# Patient Record
Sex: FEMALE | Race: WHITE | Employment: UNEMPLOYED | URBAN - METROPOLITAN AREA
[De-identification: names, ages, dates, MRNs, and addresses within clinical notes are randomized per-mention and may not be internally consistent; named-entity substitution may affect disease eponyms.]

---

## 2019-09-24 PROBLEM — Z82.79 FAMILY HISTORY OF CONGENITAL ANOMALY OF CARDIOVASCULAR SYSTEM: Status: ACTIVE | Noted: 2019-09-24

## 2019-09-24 PROCEDURE — 86901 BLOOD TYPING SEROLOGIC RH(D): CPT | Performed by: OBSTETRICS & GYNECOLOGY

## 2019-09-24 PROCEDURE — 86900 BLOOD TYPING SEROLOGIC ABO: CPT | Performed by: OBSTETRICS & GYNECOLOGY

## 2019-09-24 PROCEDURE — 87086 URINE CULTURE/COLONY COUNT: CPT | Performed by: OBSTETRICS & GYNECOLOGY

## 2019-09-24 PROCEDURE — 86850 RBC ANTIBODY SCREEN: CPT | Performed by: OBSTETRICS & GYNECOLOGY

## 2019-10-16 PROBLEM — Z34.80 ENCOUNTER FOR SUPERVISION OF OTHER NORMAL PREGNANCY, UNSPECIFIED TRIMESTER: Status: ACTIVE | Noted: 2019-10-16

## 2019-10-16 PROBLEM — Z34.80 ENCOUNTER FOR SUPERVISION OF OTHER NORMAL PREGNANCY, UNSPECIFIED TRIMESTER (HCC): Status: ACTIVE | Noted: 2019-10-16

## 2019-12-12 ENCOUNTER — TELEPHONE (OUTPATIENT)
Dept: PERINATAL CARE | Facility: HOSPITAL | Age: 29
End: 2019-12-12

## 2019-12-12 ENCOUNTER — OFFICE VISIT (OUTPATIENT)
Dept: PERINATAL CARE | Facility: HOSPITAL | Age: 29
End: 2019-12-12
Attending: OBSTETRICS & GYNECOLOGY
Payer: COMMERCIAL

## 2019-12-12 VITALS
SYSTOLIC BLOOD PRESSURE: 131 MMHG | WEIGHT: 150 LBS | DIASTOLIC BLOOD PRESSURE: 75 MMHG | HEART RATE: 97 BPM | HEIGHT: 66 IN | BODY MASS INDEX: 24.11 KG/M2

## 2019-12-12 DIAGNOSIS — Z82.79 FAMILY HISTORY OF CONGENITAL ANOMALY OF CARDIOVASCULAR SYSTEM: ICD-10-CM

## 2019-12-12 PROCEDURE — 76811 OB US DETAILED SNGL FETUS: CPT | Performed by: OBSTETRICS & GYNECOLOGY

## 2019-12-12 PROCEDURE — 99203 OFFICE O/P NEW LOW 30 MIN: CPT | Performed by: OBSTETRICS & GYNECOLOGY

## 2019-12-12 NOTE — PROGRESS NOTES
Outpatient Maternal-Fetal Medicine Consultation    Dear Dr. Harrison Porter    Thank you for requesting ultrasound evaluation and maternal fetal medicine consultation on your patient Cristela Maria.   As you are aware she is a 26year old female  with a sing ____________________________________________________________________________  Dating:  LMP: 07/24/2019 EDC: 04/29/2020 GA by LMP: 20w1d  Current Scan on: 12/12/2019 EDC: 04/26/2020 GA by current scan: 20w4d  The calculation of the gestational age by curr mm.  ____________________________________________________________________________    See PACS/Imaging Tab For Complete Ultrasound Report  I interpreted the results and reviewed them with the patient.     DISCUSSION  During her visit we discussed and reviewe

## 2020-06-09 PROBLEM — Z34.80 ENCOUNTER FOR SUPERVISION OF OTHER NORMAL PREGNANCY, UNSPECIFIED TRIMESTER (HCC): Status: RESOLVED | Noted: 2019-10-16 | Resolved: 2020-06-09

## 2020-06-09 PROBLEM — Z34.80 ENCOUNTER FOR SUPERVISION OF OTHER NORMAL PREGNANCY, UNSPECIFIED TRIMESTER: Status: RESOLVED | Noted: 2019-10-16 | Resolved: 2020-06-09

## 2021-04-07 ENCOUNTER — TELEPHONE (OUTPATIENT)
Dept: ORTHOPEDICS CLINIC | Facility: CLINIC | Age: 31
End: 2021-04-07

## 2021-04-07 ENCOUNTER — HOSPITAL ENCOUNTER (OUTPATIENT)
Age: 31
Discharge: HOME | End: 2021-04-07
Payer: COMMERCIAL

## 2021-04-07 VITALS
RESPIRATION RATE: 16 BRPM | SYSTOLIC BLOOD PRESSURE: 126 MMHG | TEMPERATURE: 98 F | HEART RATE: 65 BPM | DIASTOLIC BLOOD PRESSURE: 71 MMHG | OXYGEN SATURATION: 98 %

## 2021-04-07 DIAGNOSIS — M25.512 LEFT SHOULDER PAIN, UNSPECIFIED CHRONICITY: Primary | ICD-10-CM

## 2021-04-07 DIAGNOSIS — M75.42 IMPINGEMENT SYNDROME OF LEFT SHOULDER: Primary | ICD-10-CM

## 2021-04-07 PROCEDURE — 99203 OFFICE O/P NEW LOW 30 MIN: CPT | Performed by: PHYSICIAN ASSISTANT

## 2021-04-07 RX ORDER — PREDNISONE 20 MG/1
40 TABLET ORAL DAILY
Qty: 10 TABLET | Refills: 0 | Status: SHIPPED | OUTPATIENT
Start: 2021-04-07 | End: 2021-04-12

## 2021-04-07 NOTE — TELEPHONE ENCOUNTER
Patient coming in for LT shoulder trama. Immediate care belies that it might be a shoulder Tear. Patient did not have any imaging done. If imaging needed please place Rx and notify patient before apt on 4/9. Thank you.

## 2021-04-07 NOTE — TELEPHONE ENCOUNTER
Ordered xrays and messaged patient the information she needed to get them done before her appointment on Friday in [place].

## 2021-04-07 NOTE — ED PROVIDER NOTES
Patient Seen in: [facility]      History   Patient presents with:  Shoulder Pain    Stated Complaint: left shoulder pain    HPI/Subjective:   HPI    Pleasant 50-year-old female. Right-hand-dominant. Medical history of OCD and appendectomy. Extremities: The patient's anterior rotator cuff region is moderately tender. Limited range of motion actively. Passively, allows Full range of motion. Highly suggestive of rotator cuff injury versus acute impingement syndrome.   She has no AC joint te

## 2021-04-07 NOTE — ED INITIAL ASSESSMENT (HPI)
States past 2 weeks moving and picking up a lot of boxes and her kids. Left shoulder pain mild. 2 days ago after lifting a  Heavier box had sharp pain in her left shoulder, unable to raise arm, limited movement, states pain to any touch.  Taking advil with

## 2021-04-09 ENCOUNTER — OFFICE VISIT (OUTPATIENT)
Dept: ORTHOPEDICS CLINIC | Facility: CLINIC | Age: 31
End: 2021-04-09
Payer: COMMERCIAL

## 2021-04-09 DIAGNOSIS — Z02.9 ADMINISTRATIVE ENCOUNTER: Primary | ICD-10-CM

## 2021-05-01 ENCOUNTER — IMMUNIZATION (OUTPATIENT)
Dept: LAB | Age: 31
End: 2021-05-01
Attending: HOSPITALIST
Payer: COMMERCIAL

## 2021-05-01 DIAGNOSIS — Z23 NEED FOR VACCINATION: Primary | ICD-10-CM

## 2021-05-01 PROCEDURE — 0001A SARSCOV2 VAC 30MCG/0.3ML IM: CPT

## 2021-06-12 ENCOUNTER — IMMUNIZATION (OUTPATIENT)
Dept: LAB | Facility: HOSPITAL | Age: 31
End: 2021-06-12
Attending: HOSPITALIST
Payer: COMMERCIAL

## 2021-06-12 DIAGNOSIS — Z23 NEED FOR VACCINATION: Primary | ICD-10-CM

## 2021-06-12 PROCEDURE — 0002A SARSCOV2 VAC 30MCG/0.3ML IM: CPT

## 2021-08-26 ENCOUNTER — TELEPHONE (OUTPATIENT)
Dept: FAMILY MEDICINE CLINIC | Facility: CLINIC | Age: 31
End: 2021-08-26

## 2021-08-26 NOTE — TELEPHONE ENCOUNTER
Pt called  sees her kids and mom would like to establish care with  please advise if its okay      Thank you.

## 2021-08-30 ENCOUNTER — OFFICE VISIT (OUTPATIENT)
Dept: FAMILY MEDICINE CLINIC | Facility: CLINIC | Age: 31
End: 2021-08-30
Payer: COMMERCIAL

## 2021-08-30 VITALS
DIASTOLIC BLOOD PRESSURE: 80 MMHG | SYSTOLIC BLOOD PRESSURE: 110 MMHG | RESPIRATION RATE: 16 BRPM | HEART RATE: 75 BPM | TEMPERATURE: 99 F | OXYGEN SATURATION: 100 % | HEIGHT: 66 IN | WEIGHT: 127 LBS | BODY MASS INDEX: 20.41 KG/M2

## 2021-08-30 DIAGNOSIS — Z86.2 HISTORY OF ANEMIA: ICD-10-CM

## 2021-08-30 DIAGNOSIS — R10.11 COLICKY RUQ ABDOMINAL PAIN: ICD-10-CM

## 2021-08-30 DIAGNOSIS — R42 ORTHOSTATIC LIGHTHEADEDNESS: ICD-10-CM

## 2021-08-30 DIAGNOSIS — H61.23 BILATERAL IMPACTED CERUMEN: ICD-10-CM

## 2021-08-30 DIAGNOSIS — Z00.00 HEALTHY ADULT ON ROUTINE PHYSICAL EXAMINATION: Primary | ICD-10-CM

## 2021-08-30 LAB
ALBUMIN SERPL-MCNC: 4.3 G/DL (ref 3.4–5)
ALBUMIN/GLOB SERPL: 1.3 {RATIO} (ref 1–2)
ALP LIVER SERPL-CCNC: 49 U/L
ALT SERPL-CCNC: 19 U/L
ANION GAP SERPL CALC-SCNC: 3 MMOL/L (ref 0–18)
AST SERPL-CCNC: 11 U/L (ref 15–37)
BASOPHILS # BLD AUTO: 0.05 X10(3) UL (ref 0–0.2)
BASOPHILS NFR BLD AUTO: 0.6 %
BILIRUB SERPL-MCNC: 0.5 MG/DL (ref 0.1–2)
BUN BLD-MCNC: 15 MG/DL (ref 7–18)
CALCIUM BLD-MCNC: 8.9 MG/DL (ref 8.5–10.1)
CHLORIDE SERPL-SCNC: 110 MMOL/L (ref 98–112)
CHOLEST SMN-MCNC: 167 MG/DL (ref ?–200)
CO2 SERPL-SCNC: 26 MMOL/L (ref 21–32)
CREAT BLD-MCNC: 0.74 MG/DL
DEPRECATED HBV CORE AB SER IA-ACNC: 16.3 NG/ML
EOSINOPHIL # BLD AUTO: 0.35 X10(3) UL (ref 0–0.7)
EOSINOPHIL NFR BLD AUTO: 4.5 %
ERYTHROCYTE [DISTWIDTH] IN BLOOD BY AUTOMATED COUNT: 12.9 %
GLOBULIN PLAS-MCNC: 3.4 G/DL (ref 2.8–4.4)
GLUCOSE BLD-MCNC: 86 MG/DL (ref 70–99)
HCT VFR BLD AUTO: 42.9 %
HDLC SERPL-MCNC: 63 MG/DL (ref 40–59)
HGB BLD-MCNC: 14.1 G/DL
IMM GRANULOCYTES # BLD AUTO: 0.02 X10(3) UL (ref 0–1)
IMM GRANULOCYTES NFR BLD: 0.3 %
IRON SATURATION: 14 %
IRON SERPL-MCNC: 53 UG/DL
LDLC SERPL CALC-MCNC: 92 MG/DL (ref ?–100)
LIPASE SERPL-CCNC: 152 U/L (ref 73–393)
LYMPHOCYTES # BLD AUTO: 1.66 X10(3) UL (ref 1–4)
LYMPHOCYTES NFR BLD AUTO: 21.5 %
M PROTEIN MFR SERPL ELPH: 7.7 G/DL (ref 6.4–8.2)
MCH RBC QN AUTO: 29.7 PG (ref 26–34)
MCHC RBC AUTO-ENTMCNC: 32.9 G/DL (ref 31–37)
MCV RBC AUTO: 90.5 FL
MONOCYTES # BLD AUTO: 0.55 X10(3) UL (ref 0.1–1)
MONOCYTES NFR BLD AUTO: 7.1 %
NEUTROPHILS # BLD AUTO: 5.09 X10 (3) UL (ref 1.5–7.7)
NEUTROPHILS # BLD AUTO: 5.09 X10(3) UL (ref 1.5–7.7)
NEUTROPHILS NFR BLD AUTO: 66 %
NONHDLC SERPL-MCNC: 104 MG/DL (ref ?–130)
OSMOLALITY SERPL CALC.SUM OF ELEC: 288 MOSM/KG (ref 275–295)
PATIENT FASTING Y/N/NP: NO
PATIENT FASTING Y/N/NP: NO
PLATELET # BLD AUTO: 289 10(3)UL (ref 150–450)
POTASSIUM SERPL-SCNC: 4.1 MMOL/L (ref 3.5–5.1)
RBC # BLD AUTO: 4.74 X10(6)UL
SODIUM SERPL-SCNC: 139 MMOL/L (ref 136–145)
TOTAL IRON BINDING CAPACITY: 380 UG/DL (ref 240–450)
TRANSFERRIN SERPL-MCNC: 255 MG/DL (ref 200–360)
TRIGL SERPL-MCNC: 63 MG/DL (ref 30–149)
TSI SER-ACNC: 1.97 MIU/ML (ref 0.36–3.74)
VLDLC SERPL CALC-MCNC: 10 MG/DL (ref 0–30)
WBC # BLD AUTO: 7.7 X10(3) UL (ref 4–11)

## 2021-08-30 PROCEDURE — 3074F SYST BP LT 130 MM HG: CPT | Performed by: FAMILY MEDICINE

## 2021-08-30 PROCEDURE — 3008F BODY MASS INDEX DOCD: CPT | Performed by: FAMILY MEDICINE

## 2021-08-30 PROCEDURE — 83540 ASSAY OF IRON: CPT | Performed by: FAMILY MEDICINE

## 2021-08-30 PROCEDURE — 83550 IRON BINDING TEST: CPT | Performed by: FAMILY MEDICINE

## 2021-08-30 PROCEDURE — 80061 LIPID PANEL: CPT | Performed by: FAMILY MEDICINE

## 2021-08-30 PROCEDURE — 3079F DIAST BP 80-89 MM HG: CPT | Performed by: FAMILY MEDICINE

## 2021-08-30 PROCEDURE — 80050 GENERAL HEALTH PANEL: CPT | Performed by: FAMILY MEDICINE

## 2021-08-30 PROCEDURE — 99395 PREV VISIT EST AGE 18-39: CPT | Performed by: FAMILY MEDICINE

## 2021-08-30 PROCEDURE — 82728 ASSAY OF FERRITIN: CPT | Performed by: FAMILY MEDICINE

## 2021-08-30 PROCEDURE — 83690 ASSAY OF LIPASE: CPT | Performed by: FAMILY MEDICINE

## 2021-08-30 NOTE — PROGRESS NOTES
HPI:   Francisca Gandhi is a 31year old female who presents for a complete physical exam. Symptoms: denies discharge, itching, burning or dysuria, periods are irregular on nexplanon.       Patient complains of chest pain a few days ago, RUQ to chest area twice daily 3x weekly.        Immunization History  Administered            Date(s) Administered    Covid-19 Vaccine Pfizer 30 mcg/0.3 ml                          05/01/2021 06/12/2021      TDAP                  01/01/2010 03/12/2020    Wt Readings from L feels well otherwise  SKIN: denies any unusual skin lesions  EYES:denies blurred vision or double vision  HEENT: denies nasal congestion, sinus pain or ST  LUNGS: denies shortness of breath with exertion  CARDIOVASCULAR: denies chest pain on exertion  GI: sooner if needed. Healthy adult on routine physical examination  (primary encounter diagnosis)  Orthostatic lightheadedness  History of anemia  Colicky ruq abdominal pain  Bilateral impacted cerumen  - labs as ordered.   Had full cardiac w/u in the past

## 2021-08-31 ENCOUNTER — TELEPHONE (OUTPATIENT)
Dept: FAMILY MEDICINE CLINIC | Facility: CLINIC | Age: 31
End: 2021-08-31

## 2021-08-31 NOTE — TELEPHONE ENCOUNTER
Spoke with patient who states she read  lab result notes.  She wanted to make sure the ABD US will view the kidneys, not just the liver and gallbladder    Routed to  to advise

## 2021-08-31 NOTE — TELEPHONE ENCOUNTER
Patient called requesting an order to be place for CT SCAN OF KIDNEYS.     Please advise # [phone]

## 2022-03-01 ENCOUNTER — TELEPHONE (OUTPATIENT)
Dept: FAMILY MEDICINE CLINIC | Facility: CLINIC | Age: 32
End: 2022-03-01

## 2022-03-01 NOTE — TELEPHONE ENCOUNTER
Patent states she seems no better/worse since last seen.   Headaches, dizziness, heart palpitations      Please adv    Thank you

## 2022-03-01 NOTE — TELEPHONE ENCOUNTER
Pt states she has had a cough for a year that has been concerning to her. She does not feel sick, She has clear phlegm some difficulty swallowing. Takes OTC  Prilosec. Reduced spicy foods. She mentioned having angina in the past and having a family history of heart issues. Pt is ok with starting with orders. Future Appointments   Date Time Provider Cristobal Hernandez   3/2/2022  2:00 PM ALEXANDER KEMP NURSE GABE Sapp her phone number to schedule holter.

## 2022-03-01 NOTE — TELEPHONE ENCOUNTER
Lets repeat the iron and order a holter monitor and see what that shows. Orders placed, pls give her scheduling info.

## 2022-03-02 ENCOUNTER — NURSE ONLY (OUTPATIENT)
Dept: FAMILY MEDICINE CLINIC | Facility: CLINIC | Age: 32
End: 2022-03-02
Payer: COMMERCIAL

## 2022-03-02 DIAGNOSIS — R42 DIZZINESS: ICD-10-CM

## 2022-03-02 DIAGNOSIS — R00.2 PALPITATIONS: ICD-10-CM

## 2022-03-02 LAB
BASOPHILS # BLD AUTO: 0.06 X10(3) UL (ref 0–0.2)
BASOPHILS NFR BLD AUTO: 0.9 %
DEPRECATED HBV CORE AB SER IA-ACNC: 15.7 NG/ML
EOSINOPHIL # BLD AUTO: 0.33 X10(3) UL (ref 0–0.7)
EOSINOPHIL NFR BLD AUTO: 4.7 %
ERYTHROCYTE [DISTWIDTH] IN BLOOD BY AUTOMATED COUNT: 12.9 %
HCT VFR BLD AUTO: 41.7 %
HGB BLD-MCNC: 13.9 G/DL
IMM GRANULOCYTES # BLD AUTO: 0.01 X10(3) UL (ref 0–1)
IMM GRANULOCYTES NFR BLD: 0.1 %
IRON SATN MFR SERPL: 19 %
IRON SERPL-MCNC: 74 UG/DL
LYMPHOCYTES # BLD AUTO: 1.52 X10(3) UL (ref 1–4)
LYMPHOCYTES NFR BLD AUTO: 21.7 %
MCH RBC QN AUTO: 29.6 PG (ref 26–34)
MCHC RBC AUTO-ENTMCNC: 33.3 G/DL (ref 31–37)
MCV RBC AUTO: 88.9 FL
MONOCYTES # BLD AUTO: 0.39 X10(3) UL (ref 0.1–1)
MONOCYTES NFR BLD AUTO: 5.6 %
NEUTROPHILS # BLD AUTO: 4.7 X10 (3) UL (ref 1.5–7.7)
NEUTROPHILS # BLD AUTO: 4.7 X10(3) UL (ref 1.5–7.7)
NEUTROPHILS NFR BLD AUTO: 67 %
PLATELET # BLD AUTO: 322 10(3)UL (ref 150–450)
RBC # BLD AUTO: 4.69 X10(6)UL
TIBC SERPL-MCNC: 387 UG/DL (ref 240–450)
TRANSFERRIN SERPL-MCNC: 260 MG/DL (ref 200–360)
WBC # BLD AUTO: 7 X10(3) UL (ref 4–11)

## 2022-03-02 PROCEDURE — 85025 COMPLETE CBC W/AUTO DIFF WBC: CPT | Performed by: FAMILY MEDICINE

## 2022-03-02 PROCEDURE — 83550 IRON BINDING TEST: CPT | Performed by: FAMILY MEDICINE

## 2022-03-02 PROCEDURE — 82728 ASSAY OF FERRITIN: CPT | Performed by: FAMILY MEDICINE

## 2022-03-02 PROCEDURE — 83540 ASSAY OF IRON: CPT | Performed by: FAMILY MEDICINE

## 2022-03-02 NOTE — PROGRESS NOTES
Pt here for nurse visit labs ordered by LILLIANA. 1 gold tube and 1 purple tube drawn from right arm using 1\" needle. Pt left in good condition.

## 2022-03-31 ENCOUNTER — TELEPHONE (OUTPATIENT)
Dept: FAMILY MEDICINE CLINIC | Facility: CLINIC | Age: 32
End: 2022-03-31

## 2022-10-18 ENCOUNTER — TELEPHONE (OUTPATIENT)
Dept: FAMILY MEDICINE CLINIC | Facility: CLINIC | Age: 32
End: 2022-10-18

## 2022-10-18 DIAGNOSIS — R42 ORTHOSTATIC LIGHTHEADEDNESS: ICD-10-CM

## 2022-10-18 DIAGNOSIS — Z86.2 HISTORY OF ANEMIA: Primary | ICD-10-CM

## 2022-10-18 NOTE — TELEPHONE ENCOUNTER
Patient has been notified, verbalized understanding of information. Denies further questions. Future Appointments   Date Time Provider Cristobal Hernandez   10/31/2022 10:45 AM Nini Kent, [facility]     Pt will get labs done at appt time.

## 2022-10-18 NOTE — TELEPHONE ENCOUNTER
PATIENT SON IS SCHEDULED WITH DR TIJERINA ON 10-31-22. PATIENT HAS SCHEDULED HERSELF THAT SAME DAY FOR LABS. SHE SAYS THAT DR TIJERINA CHECKS HER IRON SINCE ITS ALWAYS LOW. PATIENT WAS ALSO WANTING CMP.

## 2022-10-31 ENCOUNTER — OFFICE VISIT (OUTPATIENT)
Dept: FAMILY MEDICINE CLINIC | Facility: CLINIC | Age: 32
End: 2022-10-31
Payer: COMMERCIAL

## 2022-10-31 VITALS
BODY MASS INDEX: 21.21 KG/M2 | HEART RATE: 75 BPM | OXYGEN SATURATION: 98 % | HEIGHT: 66 IN | TEMPERATURE: 98 F | WEIGHT: 132 LBS | SYSTOLIC BLOOD PRESSURE: 110 MMHG | DIASTOLIC BLOOD PRESSURE: 82 MMHG | RESPIRATION RATE: 16 BRPM

## 2022-10-31 DIAGNOSIS — T78.40XA ALLERGY, INITIAL ENCOUNTER: ICD-10-CM

## 2022-10-31 DIAGNOSIS — R42 ORTHOSTATIC LIGHTHEADEDNESS: ICD-10-CM

## 2022-10-31 DIAGNOSIS — Z86.2 HISTORY OF ANEMIA: ICD-10-CM

## 2022-10-31 DIAGNOSIS — Z23 NEED FOR VACCINATION: ICD-10-CM

## 2022-10-31 DIAGNOSIS — G43.709 CHRONIC MIGRAINE WITHOUT AURA WITHOUT STATUS MIGRAINOSUS, NOT INTRACTABLE: ICD-10-CM

## 2022-10-31 DIAGNOSIS — Z00.00 HEALTHY ADULT ON ROUTINE PHYSICAL EXAMINATION: Primary | ICD-10-CM

## 2022-10-31 LAB
ALBUMIN SERPL-MCNC: 4.2 G/DL (ref 3.4–5)
ALBUMIN/GLOB SERPL: 1.3 {RATIO} (ref 1–2)
ALP LIVER SERPL-CCNC: 50 U/L
ALT SERPL-CCNC: 25 U/L
ANION GAP SERPL CALC-SCNC: 5 MMOL/L (ref 0–18)
AST SERPL-CCNC: 17 U/L (ref 15–37)
BASOPHILS # BLD AUTO: 0.04 X10(3) UL (ref 0–0.2)
BASOPHILS NFR BLD AUTO: 0.7 %
BILIRUB SERPL-MCNC: 0.8 MG/DL (ref 0.1–2)
BUN BLD-MCNC: 14 MG/DL (ref 7–18)
CALCIUM BLD-MCNC: 9 MG/DL (ref 8.5–10.1)
CHLORIDE SERPL-SCNC: 109 MMOL/L (ref 98–112)
CHOLEST SERPL-MCNC: 159 MG/DL (ref ?–200)
CO2 SERPL-SCNC: 26 MMOL/L (ref 21–32)
CREAT BLD-MCNC: 0.82 MG/DL
DEPRECATED HBV CORE AB SER IA-ACNC: 18 NG/ML
EOSINOPHIL # BLD AUTO: 0.38 X10(3) UL (ref 0–0.7)
EOSINOPHIL NFR BLD AUTO: 7.1 %
ERYTHROCYTE [DISTWIDTH] IN BLOOD BY AUTOMATED COUNT: 12.9 %
FASTING PATIENT LIPID ANSWER: YES
FASTING STATUS PATIENT QL REPORTED: YES
GFR SERPLBLD BASED ON 1.73 SQ M-ARVRAT: 97 ML/MIN/1.73M2 (ref 60–?)
GLOBULIN PLAS-MCNC: 3.2 G/DL (ref 2.8–4.4)
GLUCOSE BLD-MCNC: 95 MG/DL (ref 70–99)
HCT VFR BLD AUTO: 43 %
HDLC SERPL-MCNC: 64 MG/DL (ref 40–59)
HGB BLD-MCNC: 15 G/DL
IMM GRANULOCYTES # BLD AUTO: 0.01 X10(3) UL (ref 0–1)
IMM GRANULOCYTES NFR BLD: 0.2 %
IRON SATN MFR SERPL: 21 %
IRON SERPL-MCNC: 86 UG/DL
LDLC SERPL CALC-MCNC: 84 MG/DL (ref ?–100)
LYMPHOCYTES # BLD AUTO: 1.42 X10(3) UL (ref 1–4)
LYMPHOCYTES NFR BLD AUTO: 26.4 %
MCH RBC QN AUTO: 30.9 PG (ref 26–34)
MCHC RBC AUTO-ENTMCNC: 34.9 G/DL (ref 31–37)
MCV RBC AUTO: 88.7 FL
MONOCYTES # BLD AUTO: 0.37 X10(3) UL (ref 0.1–1)
MONOCYTES NFR BLD AUTO: 6.9 %
NEUTROPHILS # BLD AUTO: 3.15 X10 (3) UL (ref 1.5–7.7)
NEUTROPHILS # BLD AUTO: 3.15 X10(3) UL (ref 1.5–7.7)
NEUTROPHILS NFR BLD AUTO: 58.7 %
NONHDLC SERPL-MCNC: 95 MG/DL (ref ?–130)
OSMOLALITY SERPL CALC.SUM OF ELEC: 290 MOSM/KG (ref 275–295)
PLATELET # BLD AUTO: 303 10(3)UL (ref 150–450)
POTASSIUM SERPL-SCNC: 4.1 MMOL/L (ref 3.5–5.1)
PROT SERPL-MCNC: 7.4 G/DL (ref 6.4–8.2)
RBC # BLD AUTO: 4.85 X10(6)UL
SODIUM SERPL-SCNC: 140 MMOL/L (ref 136–145)
TIBC SERPL-MCNC: 407 UG/DL (ref 240–450)
TRANSFERRIN SERPL-MCNC: 273 MG/DL (ref 200–360)
TRIGL SERPL-MCNC: 50 MG/DL (ref 30–149)
TSI SER-ACNC: 2.03 MIU/ML (ref 0.36–3.74)
VLDLC SERPL CALC-MCNC: 8 MG/DL (ref 0–30)
WBC # BLD AUTO: 5.4 X10(3) UL (ref 4–11)

## 2022-10-31 PROCEDURE — 99395 PREV VISIT EST AGE 18-39: CPT | Performed by: FAMILY MEDICINE

## 2022-10-31 PROCEDURE — 3079F DIAST BP 80-89 MM HG: CPT | Performed by: FAMILY MEDICINE

## 2022-10-31 PROCEDURE — 82728 ASSAY OF FERRITIN: CPT | Performed by: FAMILY MEDICINE

## 2022-10-31 PROCEDURE — 83540 ASSAY OF IRON: CPT | Performed by: FAMILY MEDICINE

## 2022-10-31 PROCEDURE — 90686 IIV4 VACC NO PRSV 0.5 ML IM: CPT | Performed by: FAMILY MEDICINE

## 2022-10-31 PROCEDURE — 83550 IRON BINDING TEST: CPT | Performed by: FAMILY MEDICINE

## 2022-10-31 PROCEDURE — 80050 GENERAL HEALTH PANEL: CPT | Performed by: FAMILY MEDICINE

## 2022-10-31 PROCEDURE — 90471 IMMUNIZATION ADMIN: CPT | Performed by: FAMILY MEDICINE

## 2022-10-31 PROCEDURE — 3074F SYST BP LT 130 MM HG: CPT | Performed by: FAMILY MEDICINE

## 2022-10-31 PROCEDURE — 80061 LIPID PANEL: CPT | Performed by: FAMILY MEDICINE

## 2022-10-31 PROCEDURE — 3008F BODY MASS INDEX DOCD: CPT | Performed by: FAMILY MEDICINE

## 2022-10-31 RX ORDER — MONTELUKAST SODIUM 10 MG/1
10 TABLET ORAL NIGHTLY
Qty: 90 TABLET | Refills: 0 | Status: SHIPPED | OUTPATIENT
Start: 2022-10-31

## 2022-10-31 RX ORDER — ALBUTEROL SULFATE 90 UG/1
2 AEROSOL, METERED RESPIRATORY (INHALATION) EVERY 6 HOURS PRN
Qty: 18 G | Refills: 0 | Status: SHIPPED | OUTPATIENT
Start: 2022-10-31

## 2022-10-31 RX ORDER — SUMATRIPTAN 25 MG/1
25 TABLET, FILM COATED ORAL EVERY 2 HOUR PRN
Qty: 9 TABLET | Refills: 1 | Status: SHIPPED | OUTPATIENT
Start: 2022-10-31

## 2022-11-01 ENCOUNTER — MED REC SCAN ONLY (OUTPATIENT)
Dept: FAMILY MEDICINE CLINIC | Facility: CLINIC | Age: 32
End: 2022-11-01

## 2023-03-13 ENCOUNTER — TELEPHONE (OUTPATIENT)
Dept: FAMILY MEDICINE CLINIC | Facility: CLINIC | Age: 33
End: 2023-03-13

## 2023-03-13 NOTE — TELEPHONE ENCOUNTER
FYI:    PT HAD AN APT ON 3/21/23 AND CANCELLED. THIS WAS MESSAGE FROM PT    Patient Comments: Decided not to take medication. Left it at the pharmacy. Doing alot better. Anxiety was high that day, a lot had to do with having to put our family dog to sleep that same day I came in, dealing with the stress of the kids and  also. Everything is better and I've found ways to do things for myself to relieve stress when it occurs.        Denver Kinney

## 2023-06-20 ENCOUNTER — TELEPHONE (OUTPATIENT)
Dept: FAMILY MEDICINE CLINIC | Facility: CLINIC | Age: 33
End: 2023-06-20

## 2023-06-20 NOTE — TELEPHONE ENCOUNTER
You can try give them a low dose of melatonin (1 mg) when it's time to go to sleep in the new location if they have a hard time going to sleep at the appropriate time. Otherwise, do nothing, they will adjust and no matter what just be off for a few days.

## 2023-06-20 NOTE — TELEPHONE ENCOUNTER
Pt and family are leaving for [facility] on Monday, asking if there is anything doc recommends for jet lag and/or her kiddos to help with the flight and time change.      Pt unsure what to expect and unsure of what will help

## 2024-02-23 ENCOUNTER — TELEPHONE (OUTPATIENT)
Dept: FAMILY MEDICINE CLINIC | Facility: CLINIC | Age: 34
End: 2024-02-23

## 2024-02-23 NOTE — TELEPHONE ENCOUNTER
Having some increased shortness of breath    Having some pain in the chest/shoulder area    Please adv  Thank you

## 2024-02-23 NOTE — TELEPHONE ENCOUNTER
Patient states she was sick 3 weeks ago, coughing up mucus and felt very sick.  Feeling better overall but still congested in right nostril.  Still has constant cough. Cough is dry now  States it feels like she is not getting enough air at times and will have to take deep breaths.  Feels pulling/pain in her left shoulder.  States has history if angina  States has to use her inhaler a few times a day and it calms it down.  Has been going on the last 2 weeks.  Started using montelukast again.  States she does not feel like she needs to rush in right now, is ok to wait until next week.  Appointment scheduled for tomorrow.    Future Appointments   Date Time Provider Department Center   2/24/2024  9:15 AM Elva Archer DO [facility] [place]

## 2024-02-24 ENCOUNTER — OFFICE VISIT (OUTPATIENT)
Dept: FAMILY MEDICINE CLINIC | Facility: CLINIC | Age: 34
End: 2024-02-24

## 2024-02-24 VITALS
WEIGHT: 136 LBS | HEART RATE: 88 BPM | TEMPERATURE: 99 F | HEIGHT: 66 IN | SYSTOLIC BLOOD PRESSURE: 132 MMHG | DIASTOLIC BLOOD PRESSURE: 86 MMHG | RESPIRATION RATE: 18 BRPM | OXYGEN SATURATION: 97 % | BODY MASS INDEX: 21.86 KG/M2

## 2024-02-24 DIAGNOSIS — R05.3 CHRONIC COUGH: Primary | ICD-10-CM

## 2024-02-24 DIAGNOSIS — T78.40XA ALLERGY, INITIAL ENCOUNTER: ICD-10-CM

## 2024-02-24 PROCEDURE — 99214 OFFICE O/P EST MOD 30 MIN: CPT | Performed by: FAMILY MEDICINE

## 2024-02-24 RX ORDER — ALBUTEROL SULFATE 90 UG/1
2 AEROSOL, METERED RESPIRATORY (INHALATION) EVERY 6 HOURS PRN
Qty: 18 G | Refills: 3 | Status: SHIPPED | OUTPATIENT
Start: 2024-02-24

## 2024-02-24 RX ORDER — FLUTICASONE PROPIONATE AND SALMETEROL XINAFOATE 115; 21 UG/1; UG/1
2 AEROSOL, METERED RESPIRATORY (INHALATION) 2 TIMES DAILY
Qty: 12 G | Refills: 0 | Status: SHIPPED | OUTPATIENT
Start: 2024-02-24

## 2024-03-06 DIAGNOSIS — T78.40XA ALLERGY, INITIAL ENCOUNTER: ICD-10-CM

## 2024-03-06 NOTE — TELEPHONE ENCOUNTER
Pt requesting a refill on her montelukast 10 MG Oral Tab     Pt rx for fluticasone-salmeterol 115-21 MCG/ACT Inhalation Aerosol is $100 a month, asking if there is a pill equivalent pt can take every day.     Please advise / send refills to [facility] [place] [address]

## 2024-03-06 NOTE — TELEPHONE ENCOUNTER
Last OV:02/24/2024  Last refill:10/31/2022, 90 tabs, 0 refill     Pt rx for fluticasone-salmeterol 115-21 MCG/ACT Inhalation Aerosol is $100 a month, asking if there is a pill equivalent pt can take every day.     Medication pended, please sign if appropriate,

## 2024-03-07 RX ORDER — MONTELUKAST SODIUM 10 MG/1
10 TABLET ORAL NIGHTLY
Qty: 90 TABLET | Refills: 0 | Status: SHIPPED | OUTPATIENT
Start: 2024-03-07

## 2024-08-09 ENCOUNTER — TELEPHONE (OUTPATIENT)
Dept: FAMILY MEDICINE CLINIC | Facility: CLINIC | Age: 34
End: 2024-08-09

## 2024-08-09 NOTE — TELEPHONE ENCOUNTER
Left message to voicemail (per verbal release form consent, confirmed with identifying message.)  Advised patient to call office back [phone] - need to triage.

## 2024-08-09 NOTE — TELEPHONE ENCOUNTER
Patient calling states had appendix removed 5 years ago, and ever since has been having gallbladder issues. States she is vomiting green. Patient requesting order for CT scan, she would like to know if she needs to be accessed in office first. Endorsed to RN.

## 2024-08-10 NOTE — TELEPHONE ENCOUNTER
Patient calling back was requested to triage, endorsed to RN.   Advised by RN to schedule office visit and care will be discussed then, patient scheduled for Monday.

## 2024-08-16 ENCOUNTER — OFFICE VISIT (OUTPATIENT)
Dept: FAMILY MEDICINE CLINIC | Facility: CLINIC | Age: 34
End: 2024-08-16
Payer: COMMERCIAL

## 2024-08-16 VITALS
RESPIRATION RATE: 18 BRPM | DIASTOLIC BLOOD PRESSURE: 70 MMHG | SYSTOLIC BLOOD PRESSURE: 118 MMHG | WEIGHT: 127.63 LBS | BODY MASS INDEX: 21 KG/M2 | TEMPERATURE: 97 F

## 2024-08-16 DIAGNOSIS — R10.11 RUQ ABDOMINAL PAIN: ICD-10-CM

## 2024-08-16 DIAGNOSIS — E61.1 LOW IRON: ICD-10-CM

## 2024-08-16 DIAGNOSIS — R14.0 ABDOMINAL BLOATING: ICD-10-CM

## 2024-08-16 DIAGNOSIS — Z00.00 HEALTHY ADULT ON ROUTINE PHYSICAL EXAMINATION: Primary | ICD-10-CM

## 2024-08-16 PROCEDURE — 99395 PREV VISIT EST AGE 18-39: CPT | Performed by: FAMILY MEDICINE

## 2024-08-16 NOTE — PROGRESS NOTES
HPI:   Breanne Marques is a 33 year old female who presents for a complete physical exam. Symptoms: denies discharge, itching, burning or dysuria, periods are regular. Patient complains of issues below.     Pain under right rib. Comes and goes. Gets worse after she eats breads and pastries. Started in the winter.   Mom and 2 sisters all have celiac disease.   Noticed she would get bloated after eating a lot of bloating, diarrhea.   She stayed away from bread and pains got better.   2 months ago she started eating breads again and got symptoms again.   Friday she had really bad \"gallbladder\" pain.   Not sure what is going on.   Hasn't had labs in a while.     Taking iron tablets every other day. Hasn't been checked for iron in almost 2 yrs.    Nexplanon removed last year. No more headaches. Feeling better.    had vasectomy.   Following with Dr. Ahmadi at [facility].   Up to date with pap.     Immunization History   Administered Date(s) Administered    Covid-19 Vaccine Pfizer 30 mcg/0.3 ml 05/01/2021, 06/12/2021    FLULAVAL 6 months & older 0.5 ml Prefilled syringe ([id number]) 10/31/2022    TDAP 01/01/2010, 03/12/2020     Wt Readings from Last 6 Encounters:   08/16/24 127 lb 9.6 oz (57.9 kg)   02/24/24 136 lb (61.7 kg)   02/10/23 134 lb 12.8 oz (61.1 kg)   10/31/22 132 lb (59.9 kg)   08/30/21 127 lb (57.6 kg)   06/01/20 148 lb (67.1 kg)     Body mass index is 20.6 kg/m².     Lab Results   Component Value Date    GLU 95 10/31/2022    GLU 86 08/30/2021     Lab Results   Component Value Date    CHOLEST 159 10/31/2022    CHOLEST 167 08/30/2021     Lab Results   Component Value Date    HDL 64 (H) 10/31/2022    HDL 63 (H) 08/30/2021     Lab Results   Component Value Date    LDL 84 10/31/2022    LDL 92 08/30/2021     Lab Results   Component Value Date    AST 17 10/31/2022    AST 11 (L) 08/30/2021     Lab Results   Component Value Date    ALT 25 10/31/2022    ALT 19 08/30/2021       Current Outpatient Medications    Medication Sig Dispense Refill    montelukast 10 MG Oral Tab Take 1 tablet (10 mg total) by mouth nightly. 90 tablet 0    albuterol 108 (90 Base) MCG/ACT Inhalation Aero Soln Inhale 2 puffs into the lungs every 6 (six) hours as needed for Wheezing. 18 g 3    Multiple Vitamin (MULTI VITAMIN DAILY OR) Take by mouth.      Prenatal Vit-DSS-Fe Cbn-FA (PRENATAL AD OR)  (Patient not taking: Reported on 8/16/2024)      fluticasone-salmeterol 115-21 MCG/ACT Inhalation Aerosol Inhale 2 puffs into the lungs 2 (two) times daily. (Patient not taking: Reported on 8/16/2024) 12 g 0      Past Medical History:    OCD (obsessive compulsive disorder)      Past Surgical History:   Procedure Laterality Date    Appendectomy  05/2019      Family History   Problem Relation Age of Onset    Diabetes Father     Hypertension Father     Heart Disease Father     High Cholesterol Father     Neurological Disorder Father         neuropathy    Obesity Father     Hypertension Mother     Cancer Mother 55        Lymphoma, non-hodgekins     Melanoma Mother 50    High Cholesterol Mother     Dementia Maternal Grandmother         Alzheimers    Alcohol abuse Maternal Grandfather 50    Dementia Paternal Grandmother     Cancer Paternal Grandfather 61        lung cancer    Obesity Sister     Heart Disorder Brother         murmur      Social History:   Social History     Socioeconomic History    Marital status:    Tobacco Use    Smoking status: Never    Smokeless tobacco: Never   Vaping Use    Vaping status: Never Used   Substance and Sexual Activity    Alcohol use: Not Currently     Alcohol/week: 0.0 standard drinks of alcohol    Drug use: No    Sexual activity: Yes     Partners: Male     Birth control/protection: Implant     Comment: nexplanon     Social Determinants of Health      Received from [facility]     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 : yes. Children: 3 young kids.   Exercise:  regular exercise.  .  Diet:  eating healthy.       REVIEW OF SYSTEMS:   GENERAL: feels well otherwise  SKIN: denies any unusual skin lesions  EYES:denies blurred vision or double vision  HEENT: denies nasal congestion, sinus pain or ST  LUNGS: denies shortness of breath with exertion  CARDIOVASCULAR: denies chest pain on exertion  GI: denies abdominal pain,denies heartburn  : denies dysuria, vaginal discharge or itching,periods regular   MUSCULOSKELETAL: denies back pain  NEURO: denies headaches  PSYCHE: denies depression or anxiety  HEMATOLOGIC: denies hx of anemia  ENDOCRINE: denies thyroid history  ALL/ASTHMA: denies hx of allergy or asthma    EXAM:   /70 (BP Location: Left arm, Patient Position: Sitting, Cuff Size: adult)   Temp 97.4 °F (36.3 °C) (Temporal)   Resp 18   Wt 127 lb 9.6 oz (57.9 kg)   LMP 08/09/2024 (Exact Date)   BMI 20.60 kg/m²   Body mass index is 20.6 kg/m².   GENERAL: well developed, well nourished,in no apparent distress  SKIN: no rashes,no suspicious lesions  HEENT: atraumatic, normocephalic,    EYES:PERRLA, EOMI, conjunctiva are clear  NECK: supple,no adenopathy,   CHEST: no chest tenderness  LUNGS: clear to auscultation  CARDIO: RRR without murmur  GI: good BS's,no masses, HSM or tenderness  : deferred, following with gyne   MUSCULOSKELETAL: back is not tender,FROM of the back  EXTREMITIES: no cyanosis, clubbing or edema  NEURO: Oriented times three,cranial nerves are intact,motor and sensory are grossly intact    ASSESSMENT AND PLAN:   Breanne Marques is a 33 year old female who presents for a complete physical exam.  Pap and pelvic done with gyne. Not due for mammogram and dexascan. Self breast exam explained. Health maintenance, will check fasting Lipids, CMP, and CBC. Pt not due for screening colonoscopy. Pt info handouts given for: exercise, low fat diet and breast self-exam. Pt' s weight is Body mass index is 20.6 kg/m²., recommended low fat diet and aerobic exercise 30 minutes three times weekly.  The patient  indicates understanding of these issues and agrees to the plan.  The patient is asked to return for CPX in 1 yr or sooner if needed.     Encounter Diagnoses   Name Primary?    Healthy adult on routine physical examination Yes    Abdominal bloating     RUQ abdominal pain     Low iron    - check labs as ordered.   - if celiac panel negative, then would order abd US given gallbladder-type pain.   - would likely benefit from avoiding gluten even if celiac negative.     Orders Placed This Encounter   Procedures    CBC With Differential With Platelet    Comp Metabolic Panel (14)    Lipid Panel    TSH W Reflex To Free T4    Ferritin    Iron And Tibc    Lipase [E]    CELIAC DISEASE COMPREHENSIVE [19955] [Q]       Meds & Refills for this Visit:  Requested Prescriptions      No prescriptions requested or ordered in this encounter       Imaging & Consults:  None

## 2024-08-19 LAB
% SATURATION: 33 % (CALC) (ref 16–45)
ABSOLUTE BASOPHILS: 40 CELLS/UL (ref 0–200)
ABSOLUTE EOSINOPHILS: 268 CELLS/UL (ref 15–500)
ABSOLUTE LYMPHOCYTES: 1338 CELLS/UL (ref 850–3900)
ABSOLUTE MONOCYTES: 387 CELLS/UL (ref 200–950)
ABSOLUTE NEUTROPHILS: 2367 CELLS/UL (ref 1500–7800)
ALBUMIN/GLOBULIN RATIO: 1.9 (CALC) (ref 1–2.5)
ALBUMIN: 4.7 G/DL (ref 3.6–5.1)
ALKALINE PHOSPHATASE: 57 U/L (ref 31–125)
ALT: 20 U/L (ref 6–29)
AST: 20 U/L (ref 10–30)
BASOPHILS: 0.9 %
BILIRUBIN, TOTAL: 0.6 MG/DL (ref 0.2–1.2)
BUN: 17 MG/DL (ref 7–25)
CALCIUM: 9.9 MG/DL (ref 8.6–10.2)
CARBON DIOXIDE: 26 MMOL/L (ref 20–32)
CHLORIDE: 105 MMOL/L (ref 98–110)
CHOL/HDLC RATIO: 2.9 (CALC)
CHOLESTEROL, TOTAL: 166 MG/DL
CREATININE: 0.69 MG/DL (ref 0.5–0.97)
EGFR: 117 ML/MIN/1.73M2
EOSINOPHILS: 6.1 %
FERRITIN: 30 NG/ML (ref 16–154)
GLOBULIN: 2.5 G/DL (CALC) (ref 1.9–3.7)
GLUCOSE: 90 MG/DL (ref 65–139)
HDL CHOLESTEROL: 57 MG/DL
HEMATOCRIT: 42.2 % (ref 35–45)
HEMOGLOBIN: 14.6 G/DL (ref 11.7–15.5)
IMMUNOGLOBULIN A: 96 MG/DL (ref 47–310)
IRON BINDING CAPACITY: 315 MCG/DL (CALC) (ref 250–450)
IRON, TOTAL: 104 MCG/DL (ref 40–190)
LDL-CHOLESTEROL: 94 MG/DL (CALC)
LIPASE: 29 U/L (ref 7–60)
LYMPHOCYTES: 30.4 %
MCH: 30.7 PG (ref 27–33)
MCHC: 34.6 G/DL (ref 32–36)
MCV: 88.8 FL (ref 80–100)
MONOCYTES: 8.8 %
MPV: 10.7 FL (ref 7.5–12.5)
NEUTROPHILS: 53.8 %
NON-HDL CHOLESTEROL: 109 MG/DL (CALC)
PLATELET COUNT: 296 THOUSAND/UL (ref 140–400)
POTASSIUM: 4.3 MMOL/L (ref 3.5–5.3)
PROTEIN, TOTAL: 7.2 G/DL (ref 6.1–8.1)
RDW: 12.5 % (ref 11–15)
RED BLOOD CELL COUNT: 4.75 MILLION/UL (ref 3.8–5.1)
SODIUM: 141 MMOL/L (ref 135–146)
TISSUE TRANSGLUTAMINASE$ANTIBODY, IGA: <1 U/ML
TRIGLYCERIDES: 66 MG/DL
TSH W/REFLEX TO FT4: 2.36 MIU/L
WHITE BLOOD CELL COUNT: 4.4 THOUSAND/UL (ref 3.8–10.8)

## 2024-08-20 DIAGNOSIS — R14.0 ABDOMINAL BLOATING: ICD-10-CM

## 2024-08-20 DIAGNOSIS — R10.11 RUQ ABDOMINAL PAIN: Primary | ICD-10-CM

## 2024-09-30 ENCOUNTER — TELEPHONE (OUTPATIENT)
Dept: FAMILY MEDICINE CLINIC | Facility: CLINIC | Age: 34
End: 2024-09-30

## 2024-09-30 NOTE — TELEPHONE ENCOUNTER
Lab Frequency Next Occurrence   US ABDOMEN COMPLETE (CPT=76700) Once 08/20/2024     Letter mailed to patient reminding them they have outstanding orders.

## 2025-02-26 DIAGNOSIS — T78.40XA ALLERGY, INITIAL ENCOUNTER: ICD-10-CM

## 2025-02-26 NOTE — TELEPHONE ENCOUNTER
Asthma & COPD Medication Protocol [id number]/26/2025 12:14 PM   Protocol Details ACT Score greater than or equal to 20    Appointment in past 6 or next 3 months    ACT recorded in the last 12 months    Medication is active on med list   Routing to provider per protocol.   montelukast 10 MG Oral Tab   Last refilled on 3/7/24 for #90  with 0 rf.   Last labs 8/16/24.   Last seen on 8/16/24.       Future Appointments   Date Time Provider Department Center   3/13/2025  9:45 AM [facility]  [place]          Thank you.

## 2025-02-27 RX ORDER — MONTELUKAST SODIUM 10 MG/1
10 TABLET ORAL NIGHTLY
Qty: 90 TABLET | Refills: 0 | Status: SHIPPED | OUTPATIENT
Start: 2025-02-27 | End: 2025-03-03 | Stop reason: ALTCHOICE

## 2025-02-28 ENCOUNTER — HOSPITAL ENCOUNTER (OUTPATIENT)
Age: 35
Discharge: OTHER INSTITUTION | End: 2025-02-28

## 2025-02-28 VITALS
OXYGEN SATURATION: 99 % | SYSTOLIC BLOOD PRESSURE: 130 MMHG | HEART RATE: 101 BPM | RESPIRATION RATE: 18 BRPM | DIASTOLIC BLOOD PRESSURE: 82 MMHG | TEMPERATURE: 98 F

## 2025-02-28 DIAGNOSIS — R10.11 RUQ PAIN: Primary | ICD-10-CM

## 2025-02-28 LAB
B-HCG UR QL: NEGATIVE
BILIRUB UR QL STRIP: NEGATIVE
CLARITY UR: CLEAR
COLOR UR: COLORLESS
GLUCOSE UR STRIP-MCNC: NEGATIVE MG/DL
HGB UR QL STRIP: NEGATIVE
KETONES UR STRIP-MCNC: NEGATIVE MG/DL
LEUKOCYTE ESTERASE UR QL STRIP: NEGATIVE
NITRITE UR QL STRIP: NEGATIVE
PH UR STRIP: 5.5 [PH]
PROT UR STRIP-MCNC: NEGATIVE MG/DL
SP GR UR STRIP: 1.01
UROBILINOGEN UR STRIP-ACNC: <2 MG/DL

## 2025-02-28 NOTE — ED INITIAL ASSESSMENT (HPI)
Pt sts constant RUQ abd pain for the past 5 days. Green emesis 2 days ago. Pain increases when laying on right side, application of heat, eating heavier meals. Similar episode 6 months ago.

## 2025-02-28 NOTE — ED PROVIDER NOTES
Patient Seen in: [facility] [place]      History     Chief Complaint   Patient presents with    Abdomen/Flank Pain     Stated Complaint: rt side abd area pain    Subjective:   HPI    34-year-old female presents to immediate care for evaluation of right upper quadrant abdominal pain starting 5 days ago.  Green emesis 2 days ago.  Pain increases when supine and after eating larger meals.  She reports same pain 6 months ago, saw PCP and was advised to get outpatient ultrasound, which she did not have because pain spontaneously resolved a few days after onset.  Abdominal surgical history consist of appendectomy.  Denies fever.      Objective:     Past Medical History:    OCD (obsessive compulsive disorder)              Past Surgical History:   Procedure Laterality Date    Appendectomy  05/2019                No pertinent social history.            Review of Systems    Positive for stated complaint: rt side abd area pain  Other systems are as noted in HPI.  Constitutional and vital signs reviewed.      All other systems reviewed and negative except as noted above.    Physical Exam     ED Triage Vitals [02/28/25 0846]   /82   Pulse 101   Resp 18   Temp 98 °F (36.7 °C)   Temp src Oral   SpO2 99 %   O2 Device None (Room air)       Current Vitals:   Vital Signs  BP: 130/82  Pulse: 101  Resp: 18  Temp: 98 °F (36.7 °C)  Temp src: Oral    Oxygen Therapy  SpO2: 99 %  O2 Device: None (Room air)        Physical Exam  Vitals and nursing note reviewed.   Constitutional:       General: She is not in acute distress.     Appearance: Normal appearance. She is not ill-appearing, toxic-appearing or diaphoretic.   Pulmonary:      Effort: Pulmonary effort is normal. No respiratory distress.   Abdominal:      Palpations: Abdomen is soft.      Tenderness: There is abdominal tenderness (RUQ).   Neurological:      Mental Status: She is alert and oriented to person, place, and time.   Psychiatric:         Behavior: Behavior normal.          ED Course     Labs Reviewed   [facility] POCT URINALYSIS DIPSTICK - Abnormal; Notable for the following components:       Result Value    Urine Color Colorless (*)     All other components within normal limits   POCT PREGNANCY URINE - Normal        MDM      Differential diagnosis considered but not limited to biliary colic, acute cholecystitis, acute cholangitis, other  Patient with significant right upper quadrant abdominal tenderness. I reviewed the limitations of this immediate care and recommended ER transfer to which patient is agreeable. She prefers to go to [facility] ER. She feels comfortable driving there and declines ambulance transfer.        Medical Decision Making      Disposition and Plan     Clinical Impression:  1. RUQ pain         Disposition:  Ic to ed  2/28/2025  9:28 am    Follow-up:  No follow-up provider specified.        Medications Prescribed:  Discharge Medication List as of 2/28/2025  9:32 AM              Supplementary Documentation:

## 2025-03-03 ENCOUNTER — OFFICE VISIT (OUTPATIENT)
Dept: FAMILY MEDICINE CLINIC | Facility: CLINIC | Age: 35
End: 2025-03-03

## 2025-03-03 VITALS
HEART RATE: 133 BPM | OXYGEN SATURATION: 98 % | DIASTOLIC BLOOD PRESSURE: 86 MMHG | WEIGHT: 133.19 LBS | BODY MASS INDEX: 22 KG/M2 | SYSTOLIC BLOOD PRESSURE: 130 MMHG | TEMPERATURE: 98 F | RESPIRATION RATE: 20 BRPM

## 2025-03-03 DIAGNOSIS — R10.11 COLICKY RUQ ABDOMINAL PAIN: Primary | ICD-10-CM

## 2025-03-03 PROCEDURE — 99214 OFFICE O/P EST MOD 30 MIN: CPT | Performed by: FAMILY MEDICINE

## 2025-03-03 RX ORDER — HYDROCODONE BITARTRATE AND ACETAMINOPHEN 10; 325 MG/1; MG/1
1 TABLET ORAL
COMMUNITY
Start: 2025-02-28

## 2025-03-03 NOTE — PROGRESS NOTES
Breanne Marques is a 34 year old female.  Chief Complaint   Patient presents with    Follow - Up     Abdominal pain under Right side of rib cage Pt states gallbladder related.        HPI:   Pt has had abd pain since last Sunday in RUQ. Also having green vomit coming up.   She went to ER at [facility] on Friday. They did US gallbladder, CT abd pelvis, pain control, sent her home. She had this in the summer and it went away in a couple of day. She is trying to wait it out, but it's now 8 days later and pain is not better if not worse.   She was not seen by a surgeon.     She had appendix out in the past and had neg scans then too. Her mom had gallbladder out and had neg scans. She would like to see a surgeon.   But, she has pain with moving, cannot lift anything, pain is constant.   Hurts under rib cage on right side.     ALLERGIES:  Allergies[1]      Current Outpatient Medications   Medication Sig Dispense Refill    HYDROcodone-acetaminophen  MG Oral Tab Take 1 tablet by mouth.      Multiple Vitamin (MULTI VITAMIN DAILY OR) Take by mouth.        Past Medical History:    OCD (obsessive compulsive disorder)      Social History:  Social History     Socioeconomic History    Marital status:    Tobacco Use    Smoking status: Never    Smokeless tobacco: Never   Vaping Use    Vaping status: Never Used   Substance and Sexual Activity    Alcohol use: Not Currently     Alcohol/week: 0.0 standard drinks of alcohol    Drug use: No    Sexual activity: Yes     Partners: Male     Birth control/protection: Implant     Comment: nexplanon     Social Drivers of Health     Food Insecurity: No Food Insecurity (3/3/2025)    NCSS - Food Insecurity     Worried About Running Out of Food in the Last Year: No     Ran Out of Food in the Last Year: No   Transportation Needs: No Transportation Needs (3/3/2025)    NCSS - Transportation     Lack of Transportation: No   Housing Stability: Not At Risk (3/3/2025)    NCSS -  Housing/Utilities     Has Housing: Yes     Worried About Losing Housing: No     Unable to Get Utilities: No        BP Readings from Last 6 Encounters:   03/03/25 130/86   02/28/25 130/82   08/16/24 118/70   02/24/24 132/86   02/10/23 130/80   10/31/22 110/82       Wt Readings from Last 6 Encounters:   03/03/25 133 lb 3.2 oz (60.4 kg)   08/16/24 127 lb 9.6 oz (57.9 kg)   02/24/24 136 lb (61.7 kg)   02/10/23 134 lb 12.8 oz (61.1 kg)   10/31/22 132 lb (59.9 kg)   08/30/21 127 lb (57.6 kg)       REVIEW OF SYSTEMS:   GENERAL HEALTH: see HPI   SKIN: denies any unusual skin lesions or rashes  RESPIRATORY: denies shortness of breath   CARDIOVASCULAR: denies chest pain on exertion  GI: denies abdominal pain and denies heartburn  NEURO: denies headaches    EXAM:   /86   Pulse (!) 133   Temp 98 °F (36.7 °C) (Temporal)   Resp 20   Wt 133 lb 3.2 oz (60.4 kg)   LMP 02/18/2025 (Exact Date)   SpO2 98%   Breastfeeding No   BMI 21.50 kg/m²  Body mass index is 21.5 kg/m².      GENERAL: well developed, well nourished,in no apparent distress  SKIN: no rashes,no suspicious lesions   HEENT: atraumatic, normocephalic,ears and throat are clear   NECK: supple,no adenopathy,    LUNGS: clear to auscultation   CARDIO: RRR without murmur  GI: good BS's,no masses, + tenderness in RUQ, + wadsworth's sign, guarding in RUQ, tenderness over the right lower ribs and under ribs, no tenderness or guarding anywhere else on abd exam.   EXTREMITIES: no cyanosis, clubbing or edema    ASSESSMENT AND PLAN:     Encounter Diagnosis   Name Primary?    Colicky RUQ abdominal pain Yes       Diagnoses and all orders for this visit:    Colicky RUQ abdominal pain    Getting worse.   Recommend surgical consult emergently since pain is not getting better a week, getting worse if anything.   She prefers to go to [facility].   I do not know surgeons there.   Referred to ER. I called ER and they will try and consult surgery.     No orders of the defined  types were placed in this encounter.              Meds & Refills for this Visit:  Requested Prescriptions      No prescriptions requested or ordered in this encounter             The patient indicates understanding of these issues and agrees to the plan.               [1] No Known Allergies

## 2025-03-06 ENCOUNTER — TELEPHONE (OUTPATIENT)
Dept: FAMILY MEDICINE CLINIC | Facility: CLINIC | Age: 35
End: 2025-03-06

## 2025-03-06 ENCOUNTER — PATIENT MESSAGE (OUTPATIENT)
Dept: FAMILY MEDICINE CLINIC | Facility: CLINIC | Age: 35
End: 2025-03-06

## 2025-03-06 NOTE — TELEPHONE ENCOUNTER
Pt states she was admitted to hospital on Monday.  Just released yesterday.  Pt had a Grade 2 intercostal muscle tear under ribs.  Tore fiber under muscle.  Healing can take 2-3 months.  GI doctor was also sent in and found gastritis and hernia and possible celiac disease - results in 1 week.  Everything else came back normal.  Pt was admitted to [facility].  States she is using lidocaine patches, icing and using meds prescribed.  JONATAN

## 2025-03-11 ENCOUNTER — PATIENT MESSAGE (OUTPATIENT)
Dept: FAMILY MEDICINE CLINIC | Facility: CLINIC | Age: 35
End: 2025-03-11

## 2025-03-14 ENCOUNTER — OFFICE VISIT (OUTPATIENT)
Dept: FAMILY MEDICINE CLINIC | Facility: CLINIC | Age: 35
End: 2025-03-14
Payer: COMMERCIAL

## 2025-03-14 VITALS
OXYGEN SATURATION: 98 % | WEIGHT: 131.38 LBS | DIASTOLIC BLOOD PRESSURE: 72 MMHG | BODY MASS INDEX: 21 KG/M2 | HEART RATE: 64 BPM | RESPIRATION RATE: 18 BRPM | SYSTOLIC BLOOD PRESSURE: 120 MMHG | TEMPERATURE: 97 F

## 2025-03-14 DIAGNOSIS — R10.11 RUQ ABDOMINAL PAIN: Primary | ICD-10-CM

## 2025-03-14 DIAGNOSIS — K90.41 NCGS (NON-CELIAC GLUTEN SENSITIVITY): ICD-10-CM

## 2025-03-14 DIAGNOSIS — R19.8 ABNORMAL FINDINGS ON ESOPHAGOGASTRODUODENOSCOPY (EGD): ICD-10-CM

## 2025-03-14 PROCEDURE — 99214 OFFICE O/P EST MOD 30 MIN: CPT | Performed by: FAMILY MEDICINE

## 2025-03-14 RX ORDER — ALBUTEROL SULFATE 90 UG/1
2 INHALANT RESPIRATORY (INHALATION)
COMMUNITY

## 2025-03-14 RX ORDER — OMEGA-3S/DHA/EPA/FISH OIL/D3 1150-1000
LIQUID (ML) ORAL DAILY
COMMUNITY

## 2025-03-14 RX ORDER — LIDOCAINE 4 G/G
1 PATCH TOPICAL DAILY
COMMUNITY
Start: 2025-03-05 | End: 2025-04-04

## 2025-03-14 NOTE — PROGRESS NOTES
Breanne Marqeus is a 34 year old female.  Chief Complaint   Patient presents with    Follow - Up     Possible celiac and inflamed liver       HPI:   Was [facility].   CT with contrast was done. Fatty liver was found.   GI saw her. She had an EGD and showed small hiatal hernia, inflammation in small intestine.   Dx with intercostal tear, but she does not know what would have caused that.   She has sisters positive for celiac.   She felt better gluten free.   This started 2 days after she went out to dinner and had gluten.   She is using OTC lidocaine patches and ice for pain control.     On EGD, liver and small intestine was inflamed.   They gave her norco, which she did not .   She took gluten out again, and pain is slowly getting better.     ALLERGIES:  Allergies[1]      Current Outpatient Medications   Medication Sig Dispense Refill    albuterol 108 (90 Base) MCG/ACT Inhalation Aero Soln Inhale 2 puffs into the lungs.      lidocaine 4 % External Patch Place 1 patch onto the skin daily.      omega-3 Oral Liquid Take by mouth daily.      Multiple Vitamin (MULTI VITAMIN DAILY OR) Take by mouth.      HYDROcodone-acetaminophen  MG Oral Tab Take 1 tablet by mouth. (Patient not taking: Reported on 3/14/2025)        Past Medical History:    OCD (obsessive compulsive disorder)      Social History:  Social History     Socioeconomic History    Marital status:    Tobacco Use    Smoking status: Never    Smokeless tobacco: Never   Vaping Use    Vaping status: Never Used   Substance and Sexual Activity    Alcohol use: Not Currently     Alcohol/week: 0.0 standard drinks of alcohol    Drug use: No    Sexual activity: Yes     Partners: Male     Birth control/protection: Implant     Comment: nexplanon     Social Drivers of Health     Food Insecurity: Low Risk  (3/5/2025)    Received from [employer]    [facility] Food Security     Within the past 12 months, the food you bought  just didn't last and you didn't have money to get more.: 3     Within the past 12 months, you worried that your food would run out before you got money to buy more.: 3   Transportation Needs: Not At Risk (3/5/2025)    Received from [facility] Transportation Needs     In the past 12 months, has lack of reliable transportation kept you from medical appointments, meetings, work or from getting things needed for daily living?: No   Housing Stability: Not At Risk (3/5/2025)    Received from [facility] Housing     What is your living situation today?: I have a steady place to live     Think about the place you live. Do you have problems with any of the following?: None of the above        BP Readings from Last 6 Encounters:   03/14/25 120/72   03/03/25 130/86   02/28/25 130/82   08/16/24 118/70   02/24/24 132/86   02/10/23 130/80       Wt Readings from Last 6 Encounters:   03/14/25 131 lb 6.4 oz (59.6 kg)   03/03/25 133 lb 3.2 oz (60.4 kg)   08/16/24 127 lb 9.6 oz (57.9 kg)   02/24/24 136 lb (61.7 kg)   02/10/23 134 lb 12.8 oz (61.1 kg)   10/31/22 132 lb (59.9 kg)       REVIEW OF SYSTEMS:   GENERAL HEALTH: feels well no complaints other than above   SKIN: denies any unusual skin lesions or rashes  RESPIRATORY: denies shortness of breath with exertion   CARDIOVASCULAR: denies chest pain on exertion  GI: denies abdominal pain and denies heartburn   NEURO: denies headaches    EXAM:   /72   Pulse 64   Temp 97.2 °F (36.2 °C) (Temporal)   Resp 18   Wt 131 lb 6.4 oz (59.6 kg)   LMP 02/18/2025 (Exact Date)   SpO2 98%   BMI 21.21 kg/m²  Body mass index is 21.21 kg/m².    GENERAL: well developed, well nourished,in no apparent distress  SKIN: no rashes,no suspicious lesions  HEENT: atraumatic, normocephalic,ears and throat are clear   NECK: supple,no adenopathy,   LUNGS: clear to auscultation, no rales or wheezing   CARDIO: RRR without murmur  GI: good BS's,no masses, + mild RUQ tenderness, no guarding  or rigidity. Benign exam compared to last time I saw her.   EXTREMITIES: no cyanosis, clubbing or edema    ASSESSMENT AND PLAN:     Encounter Diagnoses   Name Primary?    RUQ abdominal pain Yes    NCGS (non-celiac gluten sensitivity)     Abnormal findings on esophagogastroduodenoscopy (EGD)        Diagnoses and all orders for this visit:    RUQ abdominal pain  -     Gastro Referral - In Network    NCGS (non-celiac gluten sensitivity)    Abnormal findings on esophagogastroduodenoscopy (EGD)    Other orders  -     CBC With Differential With Platelet  -     Comp Metabolic Panel (14)  -     TSH W Reflex To Free T4    EGD biopsy showed no sprue, but this sounds like gluten sensitivity at least.   Gluten free diet, follow up with GI.   Labs in a month.   Call if worsening.   Can continue ice and lidocaine patches.     Orders Placed This Encounter   Procedures    CBC With Differential With Platelet    Comp Metabolic Panel (14)    TSH W Reflex To Free T4               Meds & Refills for this Visit:  Requested Prescriptions      No prescriptions requested or ordered in this encounter             The patient indicates understanding of these issues and agrees to the plan.               [1] No Known Allergies

## 2025-04-24 ENCOUNTER — TELEPHONE (OUTPATIENT)
Dept: FAMILY MEDICINE CLINIC | Facility: CLINIC | Age: 35
End: 2025-04-24

## (undated) DIAGNOSIS — R42 DIZZINESS: Primary | ICD-10-CM

## (undated) DIAGNOSIS — R00.2 PALPITATIONS: ICD-10-CM

## (undated) NOTE — LETTER
Breanne Marques   [address] [facility] [address]           Dear Breanne Marques     Our records indicate that you have outstanding lab work and or testing that was ordered for you and has not yet been completed:  Lab Frequency Next Occurrence   US ABDOMEN COMPLETE (CPT=76700) Once 08/20/2024      To provide you with the best possible care, please complete these orders at your earliest convenience. If you have recently completed these orders please disregard this letter.     If you have any questions please call the office at [phone].     Thank you,     [facility]

## (undated) NOTE — Clinical Note
I also ordered an abd US. Can you give her scheduling info. Check and see if there is also a result note before you call. I'll try and remember to tell her then too. Thanks.  KLE